# Patient Record
Sex: MALE | Race: WHITE | NOT HISPANIC OR LATINO | Employment: OTHER | URBAN - METROPOLITAN AREA
[De-identification: names, ages, dates, MRNs, and addresses within clinical notes are randomized per-mention and may not be internally consistent; named-entity substitution may affect disease eponyms.]

---

## 2017-04-10 PROBLEM — E78.5 DYSLIPIDEMIA: Status: ACTIVE | Noted: 2017-04-10

## 2017-04-10 PROBLEM — Z95.810 S/P ICD (INTERNAL CARDIAC DEFIBRILLATOR) PROCEDURE: Status: ACTIVE | Noted: 2017-04-10

## 2017-06-03 ENCOUNTER — NURSE TRIAGE (OUTPATIENT)
Dept: ADMINISTRATIVE | Facility: CLINIC | Age: 57
End: 2017-06-03

## 2017-06-03 NOTE — TELEPHONE ENCOUNTER
"Caller states missed phone call from [place] from cardiology office.  Informed caller message "need him to send a remote tracing".  Advised to follow up with office when reopens.  States the device is not connecting to the Wifi, there is a solid red light. There is 800 number troubleshooting number on device.  Recommend to contact number for assistance.    "

## 2017-06-07 PROBLEM — Z95.810 S/P ICD (INTERNAL CARDIAC DEFIBRILLATOR) PROCEDURE: Status: RESOLVED | Noted: 2017-04-10 | Resolved: 2017-06-07

## 2017-06-07 PROBLEM — R15.9 BOWEL INCONTINENCE: Status: ACTIVE | Noted: 2017-06-07

## 2017-06-07 PROBLEM — Z95.810 BIVENTRICULAR ICD (IMPLANTABLE CARDIOVERTER-DEFIBRILLATOR) IN PLACE: Status: ACTIVE | Noted: 2017-06-07

## 2017-06-07 PROBLEM — Z12.11 COLON CANCER SCREENING: Status: ACTIVE | Noted: 2017-06-07

## 2017-07-20 PROBLEM — R07.9 CHEST PAIN: Status: ACTIVE | Noted: 2017-07-20

## 2017-08-11 PROBLEM — I25.10 CAD (CORONARY ARTERY DISEASE): Status: ACTIVE | Noted: 2017-08-11

## 2017-08-18 PROBLEM — Z12.12 SCREENING FOR COLORECTAL CANCER: Status: ACTIVE | Noted: 2017-08-18

## 2017-08-18 PROBLEM — Z12.11 SCREENING FOR COLORECTAL CANCER: Status: ACTIVE | Noted: 2017-08-18

## 2017-08-29 PROBLEM — R07.9 CHEST PAIN: Status: RESOLVED | Noted: 2017-07-20 | Resolved: 2017-08-29

## 2017-08-29 PROBLEM — R55 SYNCOPE: Status: ACTIVE | Noted: 2017-08-29

## 2017-08-29 PROBLEM — K92.2 GASTROINTESTINAL HEMORRHAGE: Status: ACTIVE | Noted: 2017-08-29

## 2017-08-29 PROBLEM — R19.7 DIARRHEA: Status: ACTIVE | Noted: 2017-08-29

## 2017-08-31 PROBLEM — K92.2 GASTROINTESTINAL HEMORRHAGE: Status: RESOLVED | Noted: 2017-08-29 | Resolved: 2017-08-31

## 2017-08-31 PROBLEM — R19.7 DIARRHEA: Status: RESOLVED | Noted: 2017-08-29 | Resolved: 2017-08-31

## 2017-08-31 PROBLEM — R55 SYNCOPE: Status: RESOLVED | Noted: 2017-08-29 | Resolved: 2017-08-31

## 2017-09-01 PROBLEM — Z12.11 SCREENING FOR COLORECTAL CANCER: Status: RESOLVED | Noted: 2017-08-18 | Resolved: 2017-09-01

## 2017-09-01 PROBLEM — R55 POSTURAL DIZZINESS WITH NEAR SYNCOPE: Status: ACTIVE | Noted: 2017-09-01

## 2017-09-01 PROBLEM — R55 POSTURAL DIZZINESS WITH PRESYNCOPE: Status: ACTIVE | Noted: 2017-09-01

## 2017-09-01 PROBLEM — R42 POSTURAL DIZZINESS WITH NEAR SYNCOPE: Status: ACTIVE | Noted: 2017-09-01

## 2017-09-01 PROBLEM — Z12.11 COLON CANCER SCREENING: Status: RESOLVED | Noted: 2017-06-07 | Resolved: 2017-09-01

## 2017-09-01 PROBLEM — R42 POSTURAL DIZZINESS WITH PRESYNCOPE: Status: ACTIVE | Noted: 2017-09-01

## 2017-09-01 PROBLEM — Z12.12 SCREENING FOR COLORECTAL CANCER: Status: RESOLVED | Noted: 2017-08-18 | Resolved: 2017-09-01

## 2017-09-02 PROBLEM — I95.1 DELAYED ORTHOSTATIC HYPOTENSION: Status: ACTIVE | Noted: 2017-09-01

## 2017-09-19 PROBLEM — R55 NEAR SYNCOPE: Status: ACTIVE | Noted: 2017-09-19

## 2017-09-19 PROBLEM — Z79.01 CHRONIC ANTICOAGULATION: Status: ACTIVE | Noted: 2017-09-19

## 2017-09-19 PROBLEM — R07.9 CHEST PAIN: Status: ACTIVE | Noted: 2017-09-19

## 2017-09-19 PROBLEM — Z86.718 HISTORY OF DVT (DEEP VEIN THROMBOSIS): Status: ACTIVE | Noted: 2017-09-19

## 2017-09-19 PROBLEM — Z87.898 H/O SYNCOPE: Status: ACTIVE | Noted: 2017-09-19

## 2017-10-16 PROBLEM — K51.40 PSEUDOPOLYP OF ASCENDING COLON WITHOUT COMPLICATION: Status: ACTIVE | Noted: 2017-10-16

## 2017-10-24 PROBLEM — R07.9 CHEST PAIN: Status: RESOLVED | Noted: 2017-09-19 | Resolved: 2017-10-24

## 2017-10-24 PROBLEM — R55 NEAR SYNCOPE: Status: RESOLVED | Noted: 2017-09-19 | Resolved: 2017-10-24

## 2017-11-28 PROBLEM — R15.9 BOWEL INCONTINENCE: Status: RESOLVED | Noted: 2017-06-07 | Resolved: 2017-11-28

## 2018-06-12 PROBLEM — I34.0 NON-RHEUMATIC MITRAL REGURGITATION: Status: ACTIVE | Noted: 2018-06-12

## 2018-06-12 PROBLEM — M25.512 CHRONIC LEFT SHOULDER PAIN: Status: ACTIVE | Noted: 2018-06-12

## 2018-06-12 PROBLEM — I36.1 NON-RHEUMATIC TRICUSPID VALVE INSUFFICIENCY: Status: ACTIVE | Noted: 2018-06-12

## 2018-06-12 PROBLEM — G89.29 CHRONIC LEFT SHOULDER PAIN: Status: ACTIVE | Noted: 2018-06-12

## 2018-08-23 ENCOUNTER — TELEPHONE (OUTPATIENT)
Dept: ADMINISTRATIVE | Facility: HOSPITAL | Age: 58
End: 2018-08-23

## 2018-09-12 PROBLEM — Z86.010 HISTORY OF COLON POLYPS: Status: ACTIVE | Noted: 2018-09-12

## 2018-09-12 PROBLEM — Z86.0100 HISTORY OF COLON POLYPS: Status: ACTIVE | Noted: 2018-09-12

## 2018-10-23 PROBLEM — R07.9 CHEST PAIN: Status: ACTIVE | Noted: 2018-10-23

## 2019-04-25 ENCOUNTER — PATIENT OUTREACH (OUTPATIENT)
Dept: ADMINISTRATIVE | Facility: HOSPITAL | Age: 59
End: 2019-04-25

## 2020-04-27 ENCOUNTER — NURSE TRIAGE (OUTPATIENT)
Dept: ADMINISTRATIVE | Facility: CLINIC | Age: 60
End: 2020-04-27

## 2020-04-27 PROBLEM — R00.2 PALPITATIONS: Status: ACTIVE | Noted: 2020-04-27

## 2020-04-27 NOTE — TELEPHONE ENCOUNTER
Reason for Disposition   Message left on unidentified voice mail.  Phone number verified.    Protocols used: NO CONTACT OR DUPLICATE CONTACT CALL-A-AH

## 2020-04-27 NOTE — TELEPHONE ENCOUNTER
Callback to pt at 1817. No answer. LM indicating 2nd attempt. Advised pt to call nurse on call back if still had questions or concerns.

## 2020-04-28 ENCOUNTER — TELEPHONE (OUTPATIENT)
Dept: ADMINISTRATIVE | Facility: HOSPITAL | Age: 60
End: 2020-04-28

## 2020-04-30 PROBLEM — R06.09 DYSPNEA ON EXERTION: Status: ACTIVE | Noted: 2020-04-30

## 2020-08-10 PROBLEM — I51.9 LEFT VENTRICULAR DIASTOLIC DYSFUNCTION: Status: ACTIVE | Noted: 2020-08-10

## 2020-08-10 PROBLEM — I11.9: Status: ACTIVE | Noted: 2020-08-10

## 2021-02-10 PROBLEM — I11.9: Status: RESOLVED | Noted: 2020-08-10 | Resolved: 2021-02-10

## 2021-05-06 ENCOUNTER — PATIENT MESSAGE (OUTPATIENT)
Dept: RESEARCH | Facility: HOSPITAL | Age: 61
End: 2021-05-06

## 2021-05-10 ENCOUNTER — PATIENT MESSAGE (OUTPATIENT)
Dept: RESEARCH | Facility: HOSPITAL | Age: 61
End: 2021-05-10

## 2021-06-10 PROBLEM — I51.7 RIGHT VENTRICULAR HYPERTROPHY: Status: ACTIVE | Noted: 2021-06-10

## 2021-07-01 ENCOUNTER — PATIENT MESSAGE (OUTPATIENT)
Dept: ADMINISTRATIVE | Facility: OTHER | Age: 61
End: 2021-07-01

## 2021-08-24 PROBLEM — Z01.818 PRE-OP EVALUATION: Status: ACTIVE | Noted: 2021-08-24

## 2021-12-02 PROBLEM — M25.562 LEFT KNEE PAIN: Status: ACTIVE | Noted: 2021-12-02

## 2021-12-03 PROBLEM — I47.20 V-TACH: Status: ACTIVE | Noted: 2021-12-03

## 2021-12-03 PROBLEM — Z96.652 STATUS POST TOTAL LEFT KNEE REPLACEMENT: Status: ACTIVE | Noted: 2021-12-03

## 2021-12-03 PROBLEM — T84.033A LOOSE LEFT TOTAL KNEE ARTHROPLASTY: Status: ACTIVE | Noted: 2021-12-03

## 2021-12-10 ENCOUNTER — PATIENT OUTREACH (OUTPATIENT)
Dept: ADMINISTRATIVE | Facility: CLINIC | Age: 61
End: 2021-12-10

## 2021-12-10 DIAGNOSIS — I47.20 V-TACH: Primary | ICD-10-CM

## 2022-02-16 ENCOUNTER — PATIENT MESSAGE (OUTPATIENT)
Dept: ADMINISTRATIVE | Facility: HOSPITAL | Age: 62
End: 2022-02-16

## 2022-06-16 ENCOUNTER — PATIENT OUTREACH (OUTPATIENT)
Dept: ADMINISTRATIVE | Facility: OTHER | Age: 62
End: 2022-06-16

## 2022-06-16 NOTE — PROGRESS NOTES
[facility] - Initial Contact    Spoke with Jaskaran Sierra via telephone today.    Pt identified barriers of most importance are: no healthcare coverage.  Referrals to community agencies completed with patient/caregiver consent outside of [facility] include: mailed Medicaid information to apply by phone or online.  Referrals were put through [facility] - yes: [facility]  Support and Services:   Other information discussed the patient needs / wants help with: Medicaid coverage.  Follow up required:   Initial Outreach, Follow-up Outreach - Due: 6/16/2022, 6/29/2022

## 2022-07-06 PROBLEM — I51.7 RIGHT ATRIAL ENLARGEMENT: Status: ACTIVE | Noted: 2022-07-06

## 2022-07-06 PROBLEM — I49.3 PVC'S (PREMATURE VENTRICULAR CONTRACTIONS): Status: ACTIVE | Noted: 2022-07-06

## 2022-07-06 PROBLEM — I47.20 V-TACH: Status: RESOLVED | Noted: 2021-12-03 | Resolved: 2022-07-06

## 2022-07-06 PROBLEM — I51.7 LEFT ATRIAL ENLARGEMENT: Status: ACTIVE | Noted: 2022-07-06

## 2022-07-06 PROBLEM — I47.29 NSVT (NONSUSTAINED VENTRICULAR TACHYCARDIA): Status: ACTIVE | Noted: 2022-07-06

## 2022-08-29 PROBLEM — E11.10 DIABETIC KETOACIDOSIS WITHOUT COMA ASSOCIATED WITH TYPE 2 DIABETES MELLITUS: Status: ACTIVE | Noted: 2022-08-29

## 2022-08-30 PROBLEM — B35.1 ONYCHOMYCOSIS: Status: ACTIVE | Noted: 2022-08-30

## 2022-12-05 PROBLEM — E11.10 DIABETIC KETOACIDOSIS WITHOUT COMA ASSOCIATED WITH TYPE 2 DIABETES MELLITUS: Status: RESOLVED | Noted: 2022-08-29 | Resolved: 2022-12-05

## 2022-12-11 DIAGNOSIS — Z96.659 STATUS POST TOTAL KNEE REPLACEMENT, UNSPECIFIED LATERALITY: Primary | ICD-10-CM

## 2023-04-26 DIAGNOSIS — E11.9 TYPE 2 DIABETES MELLITUS WITHOUT COMPLICATION: ICD-10-CM

## 2023-07-10 PROBLEM — Z01.818 PRE-OP EVALUATION: Status: RESOLVED | Noted: 2021-08-24 | Resolved: 2023-07-10

## 2023-07-10 PROBLEM — R55 SYNCOPE AND COLLAPSE: Status: RESOLVED | Noted: 2017-08-29 | Resolved: 2023-07-10

## 2023-07-25 PROBLEM — Z86.718 HISTORY OF DVT (DEEP VEIN THROMBOSIS): Status: RESOLVED | Noted: 2017-09-19 | Resolved: 2023-07-25

## 2023-08-08 PROBLEM — Z86.718 HISTORY OF DVT (DEEP VEIN THROMBOSIS): Status: ACTIVE | Noted: 2023-08-08

## 2024-01-10 ENCOUNTER — PATIENT OUTREACH (OUTPATIENT)
Dept: ADMINISTRATIVE | Facility: CLINIC | Age: 64
End: 2024-01-10

## 2024-01-10 DIAGNOSIS — E11.9 TYPE 2 DIABETES MELLITUS WITHOUT COMPLICATION: ICD-10-CM

## 2024-01-10 NOTE — PROGRESS NOTES
C3 nurse attempted to contact Jaskaran Sierra  for a TCC post hospital discharge follow up call. No answer. Left voicemail with callback information. The patient has a scheduled HOSFU appointment with Latasha Logan NP  on 1/17/2024 @ 8:45 AM.

## 2024-01-11 NOTE — PROGRESS NOTES
2nd attempt made to reach patient for TCC call. Left voicemail please call [phone] leave first name, last name, and  and I will return your call.

## 2024-01-11 NOTE — PROGRESS NOTES
C3 nurse spoke with Jaskaran Sierra  for a TCC post hospital discharge follow up call. The patient has a scheduled HOSFU appointment with Latasha Logan NP  on1/17/2024 @ 8:45 AM.

## 2024-01-11 NOTE — PROGRESS NOTES
3rd attempt made to reach patient and patient's spouse, Jessica, for TCC call. Left voicemail please call [phone] leave first name, last name, and  and I will return your call

## 2024-01-24 DIAGNOSIS — E11.9 TYPE 2 DIABETES MELLITUS WITHOUT COMPLICATION: ICD-10-CM

## 2024-02-05 PROBLEM — R09.89 POOR CIRCULATION OF EXTREMITY: Status: ACTIVE | Noted: 2024-02-05

## 2024-02-05 PROBLEM — L53.9 DEPENDENT RUBOR: Status: ACTIVE | Noted: 2024-02-05

## 2024-02-05 PROBLEM — E66.811 CLASS 1 OBESITY DUE TO EXCESS CALORIES WITHOUT SERIOUS COMORBIDITY WITH BODY MASS INDEX (BMI) OF 31.0 TO 31.9 IN ADULT: Status: ACTIVE | Noted: 2024-02-05

## 2024-02-05 PROBLEM — Z91.89 MULTIPLE RISK FACTORS FOR CORONARY ARTERY DISEASE: Status: ACTIVE | Noted: 2024-02-05

## 2024-02-05 PROBLEM — E66.09 CLASS 1 OBESITY DUE TO EXCESS CALORIES WITHOUT SERIOUS COMORBIDITY WITH BODY MASS INDEX (BMI) OF 31.0 TO 31.9 IN ADULT: Status: ACTIVE | Noted: 2024-02-05

## 2024-04-04 PROBLEM — R20.0 NUMBNESS OF UPPER LIMB: Status: ACTIVE | Noted: 2024-04-04

## 2024-04-08 ENCOUNTER — PATIENT MESSAGE (OUTPATIENT)
Dept: ADMINISTRATIVE | Facility: CLINIC | Age: 64
End: 2024-04-08

## 2024-04-08 ENCOUNTER — PATIENT OUTREACH (OUTPATIENT)
Dept: ADMINISTRATIVE | Facility: CLINIC | Age: 64
End: 2024-04-08

## 2024-04-08 NOTE — PROGRESS NOTES
C3 nurse attempted to contact Jaskaran Sierra for a TCC post hospital discharge follow up call. No answer., left voicemail with callback information.     The patient has a scheduled HOSFU with his PCP, Latasha Logan NP on 4/11/24 at 1300. No messages routed at this time.

## 2024-07-14 PROBLEM — M17.11 PRIMARY OSTEOARTHRITIS OF RIGHT KNEE: Status: ACTIVE | Noted: 2024-07-14

## 2024-07-14 PROBLEM — Z96.652 STATUS POST TOTAL LEFT KNEE REPLACEMENT: Status: ACTIVE | Noted: 2024-07-14

## 2024-08-02 ENCOUNTER — NURSE TRIAGE (OUTPATIENT)
Dept: ADMINISTRATIVE | Facility: CLINIC | Age: 64
End: 2024-08-02

## 2024-08-02 NOTE — TELEPHONE ENCOUNTER
OOC RN  Dr. Corado, V   Daughter Vijaya calling wanting to know what to do with dad b/p,   99/54,   other 101/54    dizzy and light headed.    Took medicine to raise b/p and hour ago. Lisinopril, b/p not raising.   Have him sitting in chair.  For any new or worse symptoms to callback OOC RN.  Daughter VU  Number given   Reason for Disposition   Shock suspected (e.g., cold/pale/clammy skin, too weak to stand, low BP, rapid pulse)    Additional Information   Negative: Systolic BP < 90 and feeling dizzy, lightheaded, or weak   Negative: Started suddenly after an allergic medicine, an allergic food, or bee sting    Protocols used: Blood Pressure - Low-A-OH

## 2025-02-25 PROBLEM — Z87.898 HISTORY OF SYNCOPE: Status: ACTIVE | Noted: 2025-02-25

## 2025-03-13 DIAGNOSIS — E11.9 TYPE 2 DIABETES MELLITUS WITHOUT COMPLICATION: ICD-10-CM
